# Patient Record
Sex: MALE | Race: BLACK OR AFRICAN AMERICAN | NOT HISPANIC OR LATINO | URBAN - METROPOLITAN AREA
[De-identification: names, ages, dates, MRNs, and addresses within clinical notes are randomized per-mention and may not be internally consistent; named-entity substitution may affect disease eponyms.]

---

## 2018-07-12 ENCOUNTER — OFFICE VISIT (OUTPATIENT)
Dept: FAMILY MEDICINE | Facility: CLINIC | Age: 33
End: 2018-07-12
Payer: COMMERCIAL

## 2018-07-12 VITALS
BODY MASS INDEX: 34.13 KG/M2 | SYSTOLIC BLOOD PRESSURE: 111 MMHG | DIASTOLIC BLOOD PRESSURE: 78 MMHG | WEIGHT: 295 LBS | RESPIRATION RATE: 16 BRPM | TEMPERATURE: 98.1 F | OXYGEN SATURATION: 96 % | HEART RATE: 67 BPM | HEIGHT: 78 IN

## 2018-07-12 DIAGNOSIS — Z78.9 RECENT FOREIGN TRAVEL: ICD-10-CM

## 2018-07-12 DIAGNOSIS — Z00.00 ROUTINE GENERAL MEDICAL EXAMINATION AT A HEALTH CARE FACILITY: Primary | ICD-10-CM

## 2018-07-12 DIAGNOSIS — R89.9 ABNORMAL LABORATORY TEST: ICD-10-CM

## 2018-07-12 DIAGNOSIS — Z72.51 UNPROTECTED SEX: ICD-10-CM

## 2018-07-12 LAB
ALBUMIN SERPL-MCNC: 4.6 MG/DL (ref 3.8–5)
ALP SERPL-CCNC: 78.9 U/L (ref 31.7–110.7)
ALT SERPL-CCNC: 25.7 U/L (ref 0–45)
AST SERPL-CCNC: 18.3 U/L (ref 0–55)
BILIRUB SERPL-MCNC: 0.7 MG/DL (ref 0.2–1.3)
BUN SERPL-MCNC: 9.8 MG/DL (ref 7–21)
CALCIUM SERPL-MCNC: 9.2 MG/DL (ref 8.5–10.1)
CHLORIDE SERPLBLD-SCNC: 103.3 MMOL/L (ref 98–110)
CO2 SERPL-SCNC: 27.7 MMOL/L (ref 20–32)
CREAT SERPL-MCNC: 0.7 MG/DL (ref 0.7–1.3)
GFR SERPL CREATININE-BSD FRML MDRD: >90 ML/MIN/1.7 M2
GLUCOSE SERPL-MCNC: 109.1 MG'DL (ref 70–99)
POTASSIUM SERPL-SCNC: 3.8 MMOL/DL (ref 3.3–4.5)
PROT SERPL-MCNC: 7.6 G/DL (ref 6.8–8.8)
SODIUM SERPL-SCNC: 138.6 MMOL/L (ref 132.6–141.4)

## 2018-07-12 ASSESSMENT — ENCOUNTER SYMPTOMS
APPETITE CHANGE: 0
FEVER: 0
DIAPHORESIS: 0
UNEXPECTED WEIGHT CHANGE: 0
FACIAL SWELLING: 0
ACTIVITY CHANGE: 0
CHILLS: 0
FATIGUE: 0

## 2018-07-12 NOTE — PROGRESS NOTES
Male Physical Note          HPI       Mr. Welch is a 33 year old male with no significant past medical history who is coming in today to establish care. He does not report any major concerns regarding his health today other than concerns of GI issues based on his encounter with a health care provider in [facility] this April. During his visit to [facility] in April, following an acute GI disturbance with few days of diarrhea, vomiting, nausea, and abdominal pain/ epigastric burning sensation, he was advised by a health care provider about possible liver, kidney, and GI ulcer issues. In a second opinion with another health care provider in [facility], he was diagnosed with food poisoning, and his GI symptoms resolved within 2-3 weeks.  He currently does not have any GI issues. He denies any abdominal pain, burning sensation, nausea, diarrhea, vomiting, loss of appetite, fever, or headache. He also hasn't noticed any change in his bowel movements, stool color, or consistency. He states that he has a normal diet and has not had any symptomatic issues related to food in the past or present, other than the acute symptoms in April. Today, his main concern is to establish care and discuss evaluation of liver, renal, and GI function.    Concerns today: No special concerns today other than those noted above.   A [facility]  was used for  this visit.     There is no problem list on file for this patient.    History reviewed. No pertinent past medical history.    Previous Medical Care: immigration requirements (patient moved here in 2016, and returned to [facility] this past spring/summer for the first time since then); intermittent care in [facility] as described above     Family History   Problem Relation Age of Onset     Diabetes No family hx of      Coronary Artery Disease No family hx of      Hypertension No family hx of      Hyperlipidemia No family hx of      Cerebrovascular Disease No family hx of      Breast Cancer No  family hx of             Review of Systems:     Review of Systems:  CONSTITUTIONAL: NEGATIVE for fever, chills, change in weight  INTEGUMENTARY/SKIN: NEGATIVE for worrisome rashes, moles or lesions  EYES: NEGATIVE for vision changes or irritation  ENT/MOUTH: NEGATIVE for ear, mouth and throat problems  RESP: NEGATIVE for significant cough or SOB  CV: NEGATIVE for chest pain, palpitations or peripheral edema  GI: NEGATIVE for nausea, abdominal pain, heartburn, or change in bowel habits; intermittent constipation managed with prune juice  : NEGATIVE for frequency, dysuria, or hematuria  MUSCULOSKELETAL: NEGATIVE for significant arthralgias or myalgia  NEURO: NEGATIVE for weakness, dizziness or paresthesias  ENDOCRINE: NEGATIVE for temperature intolerance, skin/hair changes  HEME/ALLERGY: NEGATIVE for bleeding problems  PSYCHIATRIC: NEGATIVE for changes in mood or affect  Sleep:   Do you snore most or the night (as reported by a family member)? No  Do you feel sleepy or extremely tired during most of the day? No         Social History     Social History     Social History     Marital status: Single     Spouse name: N/A     Number of children: N/A     Years of education: N/A     Occupational History          Social History Main Topics     Smoking status: Former Smoker     Packs/day: 2.00     Types: Cigarettes     Quit date: 2016     Smokeless tobacco: Never Used     Alcohol use No     Drug use: No     Sexual activity: Not on file     Other Topics Concern     [place]oved to the [place] in [place].     Social History Narrative     No narrative on file     Marital Status:   Has anyone hurt you physically, for example by pushing, hitting, slapping or kicking you or forcing you to have sex? Denies  Do you feel threatened or controlled by a partner, ex-partner or anyone in your life? Denies    Sexual Health     Sexual concerns: No, patient is sexually active with his wife, who lives back in [facility] [place] with their 5 year  "old son, and his wife is currently pregnant. He engages in unprotected sexual intercourse with his wife.  STI History: Neg    Recommended Screening     HIV screening:  Recommended and patient accepted testing.         Physical Exam:     Vitals: /78  Pulse 67  Temp 98.1  F (36.7  C) (Oral)  Resp 16  Ht 6' 6.5\" (199.4 cm)  Wt 295 lb (133.8 kg)  SpO2 96%  BMI 33.66 kg/m2  BMI= Body mass index is 33.66 kg/(m^2).  GENERAL: healthy, alert and no distress  EYES: Eyes grossly normal to inspection, extraocular movements - intact, and PERRL  HENT: ear canals- normal; TMs- normal; Nose- normal; Mouth- no ulcers, no lesions  NECK: no tenderness, no adenopathy, no asymmetry, no masses, no stiffness; thyroid- normal to palpation  RESP: lungs clear to auscultation - no rales, no rhonchi, no wheezes  CV: regular rates and rhythm, normal S1 S2, no S3 or S4 and no murmur, no click or rub -  ABDOMEN: soft, no tenderness, no  hepatosplenomegaly, no masses, normal bowel sounds  MS: extremities- no gross deformities noted, no edema  SKIN: no suspicious lesions, no rashes  NEURO: strength and tone- normal, sensory exam- grossly normal, mentation- intact, speech- normal, reflexes- symmetric  BACK: no CVA tenderness, no paralumbar tenderness  PSYCH: Alert and oriented times 3; speech- coherent , normal rate and volume; able to articulate logical thoughts, able to abstract reason, no tangential thoughts, no hallucinations or delusions, affect- normal  LYMPHATICS: ant. cervical- normal, post. Cervical  : deferred as patient declines and has no concerns at this time    Assessment and Plan      Rebekah was seen today for establish care and discuss his concerns about GI issues. Based on his history and physical findings, his acute GI symptoms in April is not likely due to liver, renal, or GI problems. His diagnosis of food poisoning from a second opinion from another healthcare provider was likely the reason for his GI symptoms. " Based on these findings, there is no immediate concerns for liver, kidney, or GI issues. This was discussed with the patient, but he requested an evaluation of his metabolic function. Therefore a CMP was ordered today to establish baseline status. Also, based on his age (33), unknown immunization history, and recent visit to [facility] he will be receiving HIV screening (USPSTF; grade A recommendation), Quant. Gold test, and TDap. This was discussed and agreed upon by the patient.     Diagnoses and all orders for this visit:    Routine general medical examination at a health care facility    1. Health Care Maintenance: Normal Physical Exam    PLAN:  1.CMP ordered  2. HIV screening  3. TDap vaccination  4. TB test (Quant. Gold)    Options for treatment and follow-up care were reviewed with the patient. Rebekah Toro and/or guardian engaged in the decision making process and verbalized understanding of the options discussed and agreed with the final plan.     Anamaria Jacques  MS4    I was present with the medical student who participated in the service and in the documentation of this note. I have verified the history and personally performed the physical exam and medical decision making, and have verified the content of the note, which accurately reflects my assessment of the patient and the plan of care.   Patricia Shaikh, DO

## 2018-07-12 NOTE — NURSING NOTE
Due to patient being non-English speaking/uses sign language, an  was used for this visit. Only for face-to-face interpretation by an external agency, date and length of interpretation can be found on the scanned worksheet.     name: Nicole Oliva  Agency: [employer]  Language: Lao   Telephone number: [phone]  Type of interpretation: Face-to-face, spoken     Melinda White CMA

## 2018-07-12 NOTE — MR AVS SNAPSHOT
After Visit Summary   7/12/2018    Rebekah Toro    MRN: [id number]           Patient Information     Date Of Birth          1985        Visit Information        Provider Department      7/12/2018 8:20 AM Patricia Shaikh DO [facility]        Today's Diagnoses     Routine general medical examination at a health care facility    -  1    Unprotected sex        Recent foreign travel        Abnormal laboratory test          Care Instructions      Preventive Health Recommendations  Male Ages 26 - 39    Yearly exam:             See your health care provider every year in order to  o   Review health changes.   o   Discuss preventive care.    o   Review your medicines if your doctor has prescribed any.    You should be tested each year for STDs (sexually transmitted diseases), if you re at risk.     After age 35, talk to your provider about cholesterol testing. If you are at risk for heart disease, have your cholesterol tested at least every 5 years.     If you are at risk for diabetes, you should have a diabetes test (fasting glucose).  Shots: Get a flu shot each year. Get a tetanus shot every 10 years.     Nutrition:    Eat at least 5 servings of fruits and vegetables daily.     Eat whole-grain bread, whole-wheat pasta and brown rice instead of white grains and rice.     Get adequate Calcium and Vitamin D.     Lifestyle    Exercise for at least 150 minutes a week (30 minutes a day, 5 days a week). This will help you control your weight and prevent disease.     Limit alcohol to one drink per day.     No smoking.     Wear sunscreen to prevent skin cancer.     See your dentist every six months for an exam and cleaning.             Follow-ups after your visit        Who to contact     Please call your clinic at [phone] to:    Ask questions about your health    Make or cancel appointments    Discuss your medicines    Learn about your test results    Speak to your doctor             "Additional Information About Your Visit        Care EveryWhere ID     This is your Care EveryWhere ID. This could be used by other organizations to access your [facility] medical records  [id number]        Your Vitals Were     Pulse Temperature Respirations Height Pulse Oximetry BMI (Body Mass Index)    67 98.1  F (36.7  C) (Oral) 16 6' 6.5\" (199.4 cm) 96% 33.66 kg/m2       Blood Pressure from Last 3 Encounters:   07/12/18 111/78    Weight from Last 3 Encounters:   07/12/18 295 lb (133.8 kg)              We Performed the Following     Comprehensive Metabolic Panel ([facility])     HIV Antigen Antibody Combo     M Tuberculosis by Quantiferon        Primary Care Provider Fax #    Physician No Ref-Primary [phone]       No address on file        Equal Access to Services     EDYTA COMBS : Abby zabalao Sofany, waaxda luqadaha, qaybta kaalmada adeegyada, armando saleh . So [facility] [phone].    ATENCIÓN: Si habla español, tiene a myers disposición servicios gratuitos de asistencia lingüística. Llame al [phone].    We comply with applicable federal civil rights laws and [place] laws. We do not discriminate on the basis of race, color, national origin, age, disability, sex, sexual orientation, or gender identity.            Thank you!     Thank you for choosing [facility] [place]  for your care. Our goal is always to provide you with excellent care. Hearing back from our patients is one way we can continue to improve our services. Please take a few minutes to complete the written survey that you may receive in the mail after your visit with us. Thank you!             Your Updated Medication List - Protect others around you: Learn how to safely use, store and throw away your medicines at www.disposemymeds.org.      Notice  As of 7/12/2018 11:59 PM    You have not been prescribed any medications.      "

## 2018-07-12 NOTE — PROGRESS NOTES
Preceptor Attestation:   Patient seen, evaluated and discussed with the resident. I have verified the content of the note, which accurately reflects my assessment of the patient and the plan of care.   Supervising Physician:  Cyndie Eden MD

## 2018-07-13 LAB
M TB TUBERC IFN-G BLD QL: POSITIVE
M TB TUBERC IFN-G/MITOGEN IGNF BLD: 6.34 IU/ML

## 2018-07-16 LAB — HIV 1+2 AB+HIV1 P24 AG SERPL QL IA: NONREACTIVE

## 2018-07-23 ENCOUNTER — RADIANT APPOINTMENT (OUTPATIENT)
Dept: GENERAL RADIOLOGY | Facility: CLINIC | Age: 33
End: 2018-07-23
Attending: FAMILY MEDICINE
Payer: COMMERCIAL

## 2018-07-23 ENCOUNTER — OFFICE VISIT (OUTPATIENT)
Dept: FAMILY MEDICINE | Facility: CLINIC | Age: 33
End: 2018-07-23
Payer: COMMERCIAL

## 2018-07-23 VITALS
OXYGEN SATURATION: 97 % | SYSTOLIC BLOOD PRESSURE: 115 MMHG | WEIGHT: 296.8 LBS | TEMPERATURE: 98.6 F | HEART RATE: 86 BPM | DIASTOLIC BLOOD PRESSURE: 78 MMHG | BODY MASS INDEX: 33.86 KG/M2 | RESPIRATION RATE: 16 BRPM

## 2018-07-23 DIAGNOSIS — Z22.7 LATENT TUBERCULOSIS BY BLOOD TEST: Primary | ICD-10-CM

## 2018-07-23 DIAGNOSIS — R76.12 POSITIVE QUANTIFERON-TB GOLD TEST: ICD-10-CM

## 2018-07-23 RX ORDER — ISONIAZID 300 MG/1
300 TABLET ORAL DAILY
Qty: 30 TABLET | Refills: 0 | Status: SHIPPED | OUTPATIENT
Start: 2018-07-23 | End: 2018-08-20

## 2018-07-23 RX ORDER — LANOLIN ALCOHOL/MO/W.PET/CERES
100 CREAM (GRAM) TOPICAL DAILY
Qty: 30 TABLET | Refills: 11 | Status: SHIPPED | OUTPATIENT
Start: 2018-07-23 | End: 2018-10-15

## 2018-07-23 ASSESSMENT — ENCOUNTER SYMPTOMS
APNEA: 0
GASTROINTESTINAL NEGATIVE: 1
APPETITE CHANGE: 0
TROUBLE SWALLOWING: 0
FEVER: 0
DIZZINESS: 0
COUGH: 0
FATIGUE: 0
HEADACHES: 0
RHINORRHEA: 0
SORE THROAT: 0
ENDOCRINE NEGATIVE: 1
EYE REDNESS: 0
CHOKING: 0
NUMBNESS: 0
PSYCHIATRIC NEGATIVE: 1
EYE PAIN: 0
UNEXPECTED WEIGHT CHANGE: 0
MUSCULOSKELETAL NEGATIVE: 1
EYE ITCHING: 0
SINUS PRESSURE: 0
WEAKNESS: 0
SINUS PAIN: 0
CHEST TIGHTNESS: 0
LIGHT-HEADEDNESS: 0
SHORTNESS OF BREATH: 0
ACTIVITY CHANGE: 0
CHILLS: 0

## 2018-07-23 NOTE — PROGRESS NOTES
HPI       Chief Complaint   Patient presents with     PPD Reading     Positive Quant Gold/ CHest Xray; No concerns     Rebekah Toro is a 33 year old male with no significant past medical history who presents today to follow up on his recently positive Quant Gold TB results (7/12/18). The TB test was ordered on his last visit (establishing care) because of his recent travel history to [facility] (04/18) where he experienced few weeks of gastroenteritis like illness. Today he is here, as per recommended guidelines, to get a chest xray to confirm the nature of his TB infection. He denies any recent fever, chills, headache, cough, respiratory difficulties, appetite change, or chest pain. He reports feeling healthy and does not have any other health concerns.     A [facility]  was used for  this visit.    +++++++      Problem, Medication and Allergy Lists were    There are no active problems to display for this patient.  ,     ,   No Known Allergies.    Patient is an established patient of this clinic..         Review of Systems:   Review of Systems   Constitutional: Negative for activity change, appetite change, chills, fatigue, fever and unexpected weight change.   HENT: Negative for congestion, hearing loss, rhinorrhea, sinus pain, sinus pressure, sneezing, sore throat and trouble swallowing.    Eyes: Negative for pain, redness, itching and visual disturbance.   Respiratory: Negative for apnea, cough, choking, chest tightness and shortness of breath.    Cardiovascular: Negative for chest pain.   Gastrointestinal: Negative.    Endocrine: Negative.    Genitourinary: Negative.    Musculoskeletal: Negative.    Skin: Negative.    Neurological: Negative for dizziness, weakness, light-headedness, numbness and headaches.   Psychiatric/Behavioral: Negative.             Physical Exam:     Vitals:    07/23/18 0957   BP: 115/78   Pulse: 86   Resp: 16   Temp: 98.6  F (37  C)   TempSrc: Oral   SpO2: 97%   Weight: 296 lb  12.8 oz (134.6 kg)     Body mass index is 33.86 kg/(m^2).  Vitals were reviewed and were normal     Physical Exam   Constitutional: He is oriented to person, place, and time. He appears well-developed and well-nourished. No distress.   HENT:   Head: Normocephalic and atraumatic.   Right Ear: External ear normal.   Left Ear: External ear normal.   Cardiovascular: Normal rate, regular rhythm and normal heart sounds.  Exam reveals no gallop and no friction rub.    No murmur heard.  Pulmonary/Chest: Effort normal and breath sounds normal. No respiratory distress. He has no wheezes. He has no rales. He exhibits no tenderness.   Musculoskeletal: Normal range of motion.   Neurological: He is alert and oriented to person, place, and time.   Skin: He is not diaphoretic.   Psychiatric: He has a normal mood and affect. His behavior is normal.   Nursing note and vitals reviewed.        Results:      Results from this visit  Results for orders placed or performed in visit on 07/12/18   HIV Antigen Antibody Combo   Result Value Ref Range    HIV Antigen Antibody Combo Nonreactive NR^Nonreactive       M Tuberculosis by Quantiferon   Result Value Ref Range    M Tuberculosis Result Positive (A) NEG^Negative    M Tuberculosis Antigen Value 6.34 IU/mL   Comprehensive Metabolic Panel ([facility])   Result Value Ref Range    Albumin 4.6 3.8 - 5.0 mg/dL    Alkaline Phosphatase 78.9 31.7 - 110.7 U/L    ALT 25.7 0.0 - 45.0 U/L    AST 18.3 0.0 - 55.0 U/L    Bilirubin Total 0.7 0.2 - 1.3 mg/dL    Urea Nitrogen 9.8 7.0 - 21.0 mg/dL    Calcium 9.2 8.5 - 10.1 mg/dL    Chloride 103.3 98.0 - 110.0 mmol/L    Carbon Dioxide 27.7 20.0 - 32.0 mmol/L    Creatinine 0.7 0.7 - 1.3 mg/dL    Glucose 109.1 (H) 70.0 - 99.0 mg'dL    Potassium 3.8 3.3 - 4.5 mmol/dL    Sodium 138.6 132.6 - 141.4 mmol/L    Protein Total 7.6 6.8 - 8.8 g/dL    GFR Estimate >90 >60.0 mL/min/1.7 m2    GFR Estimate If Black >90 >60.0 mL/min/1.7 m2     Chest x-ray: Normal, no cavitary  lesions or other abnormalities seen.  Personally reviewed by this MD    Assessment and Plan        Rebekah Toro is a 33 year old male with no significant past medical history who presented today to follow up on his recently positive Quant Gold TB results (7/12/18).  A chest xray was recommended as per guidelines following a positive Quant Gold TB test in this patient. Result from the chest XR were normal and didn't show any signs of active TB infection. In lack of any symptomatic findings and a negative chest xray, the patient most likely has a latent TB infection. Although he is currently asymptomatic, treatment is indicated to prevent future reactivation. This was discussed with the patient and plans for a 9 month course of Isoniazid 300 mg daily was made. Potential side effects of isoniazid were discussed.  Patient will follow up with pharmacy in a month.      Latent tuberculosis by blood test  -     isoniazid (NYDRAZID) 300 MG tablet; Take 1 tablet (300 mg) by mouth daily  -     pyridoxine (VITAMIN B-6) 50 MG TABS; Take 2 tablets (100 mg) by mouth daily    Positive QuantiFERON-TB Gold test  -     XR CHEST 1 VW (+PPD OR PREOP); Future    Follow up plan:  Patient has been instructed to make an appointment and follow up with a Pharmacist in 4 weeks for Latent TB       Options for treatment and follow-up care were reviewed with the patient. Rebekah Toro  engaged in the decision making process and verbalized understanding of the options discussed and agreed with the final plan.    Dwayne Poole MD

## 2018-07-23 NOTE — MR AVS SNAPSHOT
After Visit Summary   7/23/2018    Rebekah Toro    MRN: [id number]           Patient Information     Date Of Birth          1985        Visit Information        Provider Department      7/23/2018 10:00 AM Dwayne Poole MD [facility]        Today's Diagnoses     Latent tuberculosis by blood test    -  1    Positive QuantiFERON-TB Gold test          Care Instructions    Here is the plan from today's visit    1. Latent tuberculosis by blood test  Take each of the following once daily   - isoniazid (NYDRAZID) 300 MG tablet; Take 1 tablet (300 mg) by mouth daily  Dispense: 30 tablet; Refill: 0  - pyridoxine (VITAMIN B-6) 50 MG TABS; Take 2 tablets (100 mg) by mouth daily  Dispense: 30 tablet; Refill: 11    2. Positive QuantiFERON-TB Gold test  - XR CHEST 1 VW (+PPD OR PREOP); Future      Follow up plan  Please make an appointment and follow up with a Pharmacist in 4 weeks for Latent TB     Thank you for coming to [facility] today.  Lab Testing:  **If you had lab testing today and your results are reassuring or normal they will be mailed to you or sent through [email or web address] within 7 days.   **If the lab tests need quick action we will call you with the results.  The phone number we will call with results is # [phone] (home) . If this is not the best number please call our clinic and change the number.  Medication Refills:  If you need any refills please call your pharmacy and they will contact us.   If you need to  your refill at a new pharmacy, please contact the new pharmacy directly. The new pharmacy will help you get your medications transferred faster.   Scheduling:  If you have any concerns about today's visit or wish to schedule another appointment please call our office during normal business hours [phone] (8-5:00 M-F)  If a referral was made to a [facility] and you don't get a call from central scheduling please call  [phone].  If a Mammogram was ordered for you at [facility] call [phone] to schedule or change your appointment.  If you had an XRay/CT/Ultrasound/MRI ordered the number is [phone] to schedule or change your radiology appointment.   Medical Concerns:  If you have urgent medical concerns please call [phone] at any time of the day.    Dwayne Poole MD            Follow-ups after your visit        Who to contact     Please call your clinic at [phone] to:    Ask questions about your health    Make or cancel appointments    Discuss your medicines    Learn about your test results    Speak to your doctor            Additional Information About Your Visit        Care EveryWhere ID     This is your Care EveryWhere ID. This could be used by other organizations to access your [facility] medical records  [id number]        Your Vitals Were     Pulse Temperature Respirations Pulse Oximetry BMI (Body Mass Index)       86 98.6  F (37  C) (Oral) 16 97% 33.86 kg/m2        Blood Pressure from Last 3 Encounters:   07/23/18 115/78   07/12/18 111/78    Weight from Last 3 Encounters:   07/23/18 296 lb 12.8 oz (134.6 kg)   07/12/18 295 lb (133.8 kg)                 Today's Medication Changes          These changes are accurate as of 7/23/18 10:43 AM.  If you have any questions, ask your nurse or doctor.               Start taking these medicines.        Dose/Directions    isoniazid 300 MG tablet   Commonly known as:  NYDRAZID   Used for:  Latent tuberculosis by blood test   Started by:  Dwayne Poole MD        Dose:  300 mg   Take 1 tablet (300 mg) by mouth daily   Quantity:  30 tablet   Refills:  0       pyridoxine 50 MG Tabs   Commonly known as:  VITAMIN B-6   Used for:  Latent tuberculosis by blood test   Started by:  Dwayne Poole MD        Dose:  100 mg   Take 2 tablets (100 mg) by mouth daily   Quantity:  30 tablet   Refills:  11            Where to get your medicines      These medications  were sent to [facility] [id number] [place] [facility] - [place], [place] - [address]  [address] [address]     Phone:  [phone]     isoniazid 300 MG tablet    pyridoxine 50 MG Tabs                Primary Care Provider Fax #    Physician No Ref-Primary [phone]       No address on file        Equal Access to Services     RADHAREJI GARRET : Hadii aad ku hadasho Soomaali, waaxda luqadaha, qaybta kaalmada adeagatayada, armando cheema. So [facility] [phone].    ATENCIÓN: Si habla español, tiene a myers disposición servicios gratuitos de asistencia lingüística. [facility] [phone].    We comply with applicable federal civil rights laws and [place] laws. We do not discriminate on the basis of race, color, national origin, age, disability, sex, sexual orientation, or gender identity.            Thank you!     Thank you for choosing [facility]  for your care. Our goal is always to provide you with excellent care. Hearing back from our patients is one way we can continue to improve our services. Please take a few minutes to complete the written survey that you may receive in the mail after your visit with us. Thank you!             Your Updated Medication List - Protect others around you: Learn how to safely use, store and throw away your medicines at www.disposemymeds.org.          This list is accurate as of 7/23/18 10:43 AM.  Always use your most recent med list.                   Brand Name Dispense Instructions for use Diagnosis    isoniazid 300 MG tablet    NYDRAZID    30 tablet    Take 1 tablet (300 mg) by mouth daily    Latent tuberculosis by blood test       pyridoxine 50 MG Tabs    VITAMIN B-6    30 tablet    Take 2 tablets (100 mg) by mouth daily    Latent tuberculosis by blood test

## 2018-07-23 NOTE — PATIENT INSTRUCTIONS
Here is the plan from today's visit    1. Latent tuberculosis by blood test  Take each of the following once daily   - isoniazid (NYDRAZID) 300 MG tablet; Take 1 tablet (300 mg) by mouth daily  Dispense: 30 tablet; Refill: 0  - pyridoxine (VITAMIN B-6) 50 MG TABS; Take 2 tablets (100 mg) by mouth daily  Dispense: 30 tablet; Refill: 11    2. Positive QuantiFERON-TB Gold test  - XR CHEST 1 VW (+PPD OR PREOP); Future      Follow up plan  Please make an appointment and follow up with a Pharmacist in 4 weeks for Latent TB     Thank you for coming to [facility] today.  Lab Testing:  **If you had lab testing today and your results are reassuring or normal they will be mailed to you or sent through [email or web address] within 7 days.   **If the lab tests need quick action we will call you with the results.  The phone number we will call with results is # [phone] (home) . If this is not the best number please call our clinic and change the number.  Medication Refills:  If you need any refills please call your pharmacy and they will contact us.   If you need to  your refill at a new pharmacy, please contact the new pharmacy directly. The new pharmacy will help you get your medications transferred faster.   Scheduling:  If you have any concerns about today's visit or wish to schedule another appointment please call our office during normal business hours [phone] (8-5:00 M-F)  If a referral was made to a [facility] and you don't get a call from central scheduling please call [phone].  If a Mammogram was ordered for you at [facility] call [phone] to schedule or change your appointment.  If you had an XRay/CT/Ultrasound/MRI ordered the number is [phone] to schedule or change your radiology appointment.   Medical Concerns:  If you have urgent medical concerns please call [phone] at any time of the day.    Dwayne Poole MD

## 2018-07-23 NOTE — NURSING NOTE
Due to patient being non-English speaking/uses sign language, an  was used for this visit. Only for face-to-face interpretation by an external agency, date and length of interpretation can be found on the scanned worksheet.     name: Paty Marrero  Agency: [employer]  Language: Anguillan   Telephone number: [phone]  Type of interpretation: Face-to-face, spoken     Rebekah Rodriguez CMA

## 2018-07-29 NOTE — PROGRESS NOTES
07/29/18  Addendum to visit to fix incorrect documentation of medical student role in the patient's care.  Patricia Shaikh, DO  PGY2 Family Medicine Resident  Pager: [phone]

## 2018-08-20 ENCOUNTER — OFFICE VISIT (OUTPATIENT)
Dept: PHARMACY | Facility: CLINIC | Age: 33
End: 2018-08-20
Payer: COMMERCIAL

## 2018-08-20 VITALS
OXYGEN SATURATION: 97 % | WEIGHT: 301.8 LBS | SYSTOLIC BLOOD PRESSURE: 125 MMHG | BODY MASS INDEX: 34.43 KG/M2 | HEART RATE: 79 BPM | RESPIRATION RATE: 16 BRPM | DIASTOLIC BLOOD PRESSURE: 89 MMHG | TEMPERATURE: 97 F

## 2018-08-20 DIAGNOSIS — Z22.7 LATENT TUBERCULOSIS BY BLOOD TEST: ICD-10-CM

## 2018-08-20 RX ORDER — ISONIAZID 300 MG/1
300 TABLET ORAL DAILY
Qty: 30 TABLET | Refills: 0 | Status: SHIPPED | OUTPATIENT
Start: 2018-08-20 | End: 2018-09-17

## 2018-08-20 NOTE — PATIENT INSTRUCTIONS
MEDICATION COMMENTS FROM TODAY:  Suggestions:  -       Continue INH therapy       Follow up:  -       With the pharmacist in 4 weeks  -    -            I offer these suggestions to him and his medical providers. I suggested Marieal make a follow-up appointment with his provider(s) to discuss these suggestions.      Any questions or concerns, please call [phone] or contact me via [employer].     Steven Paiz, PharmD  Medication Management Pharmacist

## 2018-08-20 NOTE — NURSING NOTE
Due to patient being non-English speaking/uses sign language, an  was used for this visit. Only for face-to-face interpretation by an external agency, date and length of interpretation can be found on the scanned worksheet.     name: Paty  Agency: [employer]  Language: Afghan   Telephone number: [phone]  Type of interpretation: Face-to-face, spoken   RAMONA Navarrete 10:04 AM August 20, 2018

## 2018-08-20 NOTE — MR AVS SNAPSHOT
After Visit Summary   8/20/2018    Rebekah Toro    MRN: [id number]           Patient Information     Date Of Birth          1985        Visit Information        Provider Department      8/20/2018 10:00 AM [facility]        Today's Diagnoses     Latent tuberculosis by blood test          Care Instructions    MEDICATION COMMENTS FROM TODAY:  Suggestions:  -       Continue INH therapy       Follow up:  -       With the pharmacist in 4 weeks  -    -            I offer these suggestions to him and his medical providers. I suggested Rebekah make a follow-up appointment with his provider(s) to discuss these suggestions.      Any questions or concerns, please call [phone] or contact me via [employer].     Steven Paiz, PharmD  Medication Management Pharmacist              Follow-ups after your visit        Who to contact     Please call your clinic at [phone] to:    Ask questions about your health    Make or cancel appointments    Discuss your medicines    Learn about your test results    Speak to your doctor            Additional Information About Your Visit        Care EveryWhere ID     This is your Care EveryWhere ID. This could be used by other organizations to access your [facility] medical records  [id number]        Your Vitals Were     Pulse Temperature Respirations Pulse Oximetry BMI (Body Mass Index)       79 97  F (36.1  C) (Oral) 16 97% 34.43 kg/m2        Blood Pressure from Last 3 Encounters:   08/20/18 125/89   07/23/18 115/78   07/12/18 111/78    Weight from Last 3 Encounters:   08/20/18 301 lb 12.8 oz (136.9 kg)   07/23/18 296 lb 12.8 oz (134.6 kg)   07/12/18 295 lb (133.8 kg)              Today, you had the following     No orders found for display         Where to get your medicines      These medications were [facility] to [facility] [place] [place] - [address]  [address] [address]     Phone:   [phone]     isoniazid 300 MG tablet          Primary Care Provider Office Phone # Fax #    Patricia Shaikh [phone]       [address] [address]        Equal Access to Services     EDYTA COMBS : Hadii ashlie ku hadlorraineo Soomaali, waaxda luqadaha, qaybta kaalmada adeegyada, waxay idiin haywoon adeeg hilario therese cheema. So [facility] [phone].    ATENCIÓN: Si habla español, tiene a myers disposición servicios gratuitos de asistencia lingüística. Llame al [phone].    We comply with applicable federal civil rights laws and [place] laws. We do not discriminate on the basis of race, color, national origin, age, disability, sex, sexual orientation, or gender identity.            Thank you!     Thank you for choosing [facility]  for your care. Our goal is always to provide you with excellent care. Hearing back from our patients is one way we can continue to improve our services. Please take a few minutes to complete the written survey that you may receive in the mail after your visit with us. Thank you!             Your Updated Medication List - Protect others around you: Learn how to safely use, store and throw away your medicines at www.disposemymeds.org.          This list is accurate as of 8/20/18 10:35 AM.  Always use your most recent med list.                   Brand Name Dispense Instructions for use Diagnosis    isoniazid 300 MG tablet    NYDRAZID    30 tablet    Take 1 tablet (300 mg) by mouth daily    Latent tuberculosis by blood test       pyridoxine 50 MG Tabs    VITAMIN B-6    30 tablet    Take 2 tablets (100 mg) by mouth daily    Latent tuberculosis by blood test

## 2018-08-20 NOTE — PROGRESS NOTES
Clinical Pharmacy Note     Rebekah was referred by Patricia Hollis  for pharmacy services for MTM      MEDICATION REVIEW:  Discussed all medication indications, dosage and effectiveness, adverse effects, and adherence with patient/caregiver.    Pt had meds with them: no  Pt had med list with them: no  Pt was knowledgeable about meds: yes  Medications set up by: self   Medications administered by someone else (e.g., LTCF): No  Pt uses a medication box or automated dispenser: no  Called pharmacy to obtain or clarify med list:  no  Called HHN or LTCF to obtain or clarify med list:  no    Medication Discrepancies  Medications on EMR med list that pt is NOT taking:  none  Medications pt IS taking that are NOT on EMR med list (e.g., from specialist, hospital): none  OTC meds/ dietary supplements pt taking on own that are NOT on EMR med list:  none  Dosage listed differently than how patient is taking: none  Frequency listed differently than how patient is taking: none  Duplicate medication on list (two occurrences of the same medication):  none  TOTAL NUMBER OF MEDICATION DISCREPANCIES:  0  ______________________________________________________________________      Subjective:  Rebekah is here today for follow-up of his latent tuberculosis.      1) LATENT TUBERCULOSIS;     Rebekah TB Gold test was seen by Dr. Poole on July 23, 2018 and diagnosed with latent tuberculosis using positive TB GOLD test.  Chest Ray Negative.    Rebekah was started on isoniazid 300 mg daily.  He has also been taking the vitamin B6 on a daily basis.    He was provided education on latent tuberculosis and understands the basics around this condition.  He also understands that this is to prevent future disease or complications related to latent tuberculosis.    Poor appetite No  Nausea / Vomiting No  RUQ abdominal tenderness:  No  Tea/ Coffee colored Urine:  No  Unusual Fatigue: No  Dizziness: No  Rash/ Itching:  No  Yellow Skin/ eyes:   No  Numbness/ Tingling in arms/legs: No  Fever for 3 days: No      Adherence:  Missed doses this month: 0        Patient reported being compliant all of the time   Patient reports no side effects.      Objective:    /89 (BP Location: Left arm, Patient Position: Sitting, Cuff Size: Adult Large)  Pulse 79  Temp 97  F (36.1  C) (Oral)  Resp 16  Wt 301 lb 12.8 oz (136.9 kg)  SpO2 97%  BMI 34.43 kg/m2  BP Readings from Last 6 Encounters:   08/20/18 125/89   07/23/18 115/78   07/12/18 111/78     Estimated Creatinine Clearance: 236.4 mL/min (based on Cr of 0.7).  GFR Estimate   Date Value Ref Range Status   07/12/2018 >90 >60.0 mL/min/1.7 m2 Final     GFR Estimate If Black   Date Value Ref Range Status   07/12/2018 >90 >60.0 mL/min/1.7 m2 Final       There is no immunization history on file for this patient.   Patient Active Problem List   Diagnosis     Latent tuberculosis by blood test     SOCIAL HISTORY:   reports that he quit smoking about 2 years ago. His smoking use included Cigarettes. He smoked 2.00 packs per day. He has never used smokeless tobacco. He reports that he does not drink alcohol or use illicit drugs.    Current Outpatient Prescriptions   Medication Sig Dispense Refill     isoniazid (NYDRAZID) 300 MG tablet Take 1 tablet (300 mg) by mouth daily 30 tablet 0     pyridoxine (VITAMIN B-6) 50 MG TABS Take 2 tablets (100 mg) by mouth daily 30 tablet 11     No Known Allergies    Environmental factors that may impact patient: none.  /89 (BP Location: Left arm, Patient Position: Sitting, Cuff Size: Adult Large)  Pulse 79  Temp 97  F (36.1  C) (Oral)  Resp 16  Wt 301 lb 12.8 oz (136.9 kg)  SpO2 97%  BMI 34.43 kg/m2    Drug Therapy Assessment:  Drug therapy problems identified:       1. Latent tuberculosis by blood test  Today I spent time educating the patient latent tuberculosis.  Rebekah  has been able to tolerate isoniazid.  Describes no adverse effects and is willing to continue in  therapy.  Has completed first month of his 9 month therapy.  He understands that he needs to complete the remaining months, and upon completion will receive a letter from the clinic describing therapy treatment complete.        All medications were reviewed and found to be indicated, effective, safe and convenient unless drug therapy problem identified as described above.        Drug Therapy Plan and Follow up:    Plan  1. Latent tuberculosis by blood test    - isoniazid (NYDRAZID) 300 MG tablet; Take 1 tablet (300 mg) by mouth daily  Dispense: 30 tablet; Refill: 0        Follow up: One month with pharmacy  Patient was provided with written instructions/medication list via [employer]        Options for treatment and/or follow-up care were reviewed with the patient. Patient was engaged and actively involved in the decision making process.  Rebekah Toro verbalized understanding of the options discussed and was satisfied with the final plan.      Dr. Patricia Shaikh  was provided my action  via routed note and Dr. Poole was available for supervision during this visit and is the authorizing prescriber for this visit through the pharmacist collaborative practice agreement.      Steven Paiz, Pharm.D             Total Time: 20  # DTPs Identified: 1    Medical Condition 1: ID (i.e. CAP, UTI, URI, LTBI), Goals of therapy: Not at goal, Drug Class: Antibiotic, Indication: Needs additional drug therapy,  ,  ,  , Intervention: Refill, Verification: Patient Agreed - Compliance/Education   ,  ,  ,  ,  ,  ,  ,  ,     ,  ,  ,  ,  ,  ,  ,  ,     ,  ,  ,

## 2018-09-17 ENCOUNTER — OFFICE VISIT (OUTPATIENT)
Dept: PHARMACY | Facility: CLINIC | Age: 33
End: 2018-09-17
Payer: COMMERCIAL

## 2018-09-17 VITALS
RESPIRATION RATE: 16 BRPM | DIASTOLIC BLOOD PRESSURE: 77 MMHG | BODY MASS INDEX: 34.8 KG/M2 | HEART RATE: 81 BPM | TEMPERATURE: 99.4 F | OXYGEN SATURATION: 96 % | SYSTOLIC BLOOD PRESSURE: 119 MMHG | WEIGHT: 305 LBS

## 2018-09-17 DIAGNOSIS — Z22.7 LATENT TUBERCULOSIS BY BLOOD TEST: ICD-10-CM

## 2018-09-17 RX ORDER — ISONIAZID 300 MG/1
300 TABLET ORAL DAILY
Qty: 30 TABLET | Refills: 0 | Status: SHIPPED | OUTPATIENT
Start: 2018-09-17 | End: 2018-09-17

## 2018-09-17 RX ORDER — ISONIAZID 300 MG/1
300 TABLET ORAL DAILY
Qty: 30 TABLET | Refills: 0 | Status: SHIPPED | OUTPATIENT
Start: 2018-09-17 | End: 2018-10-15

## 2018-09-17 NOTE — PROGRESS NOTES
Clinical Pharmacy Note     Rebekah was referred by Patricia Hollis  for pharmacy services for MTM      MEDICATION REVIEW:  Discussed all medication indications, dosage and effectiveness, adverse effects, and adherence with patient/caregiver.    Pt had meds with them: no  Pt had med list with them: no  Pt was knowledgeable about meds: YES  Medications set up by: self  Medications administered by someone else (e.g., LTCF): No  Pt uses a medication box or automated dispenser: no  Called pharmacy to obtain or clarify med list:  no  Called HHN or LTCF to obtain or clarify med list:  no    Medication Discrepancies  Medications on EMR med list that pt is NOT taking:  none  Medications pt IS taking that are NOT on EMR med list (e.g., from specialist, hospital): none  OTC meds/ dietary supplements pt taking on own that are NOT on EMR med list:  none  Dosage listed differently than how patient is taking: none  Frequency listed differently than how patient is taking: none  Duplicate medication on list (two occurrences of the same medication):  none  TOTAL NUMBER OF MEDICATION DISCREPANCIES:  0  ______________________________________________________________________      Subjective:  Rebekah is here today for follow-up of his isoniazid therapy.  He has been compliant with his medication, and continues to take the vitamin B6.    1)LTBI  Poor appetite No  Nausea / Vomiting No  RUQ abdominal tenderness:  No  Tea/ Coffee colored Urine:  Notes a brighter yellow color in his urine  Unusual Fatigue: No  Dizziness: No  Rash/ Itching:  No  Yellow Skin/ eyes:  No  Numbness/ Tingling in arms/legs: No  Fever for 3 days: No      Adherence:  Missed doses this month: 0      Patient reported being compliant all of the time   Patient reports no side effects.      Objective:    /77  Pulse 81  Temp 99.4  F (37.4  C) (Oral)  Resp 16  Wt 305 lb (138.3 kg)  SpO2 96%  BMI 34.8 kg/m2  BP Readings from Last 6 Encounters:   09/17/18 119/77    08/20/18 125/89   07/23/18 115/78   07/12/18 111/78     Estimated Creatinine Clearance: 237.6 mL/min (based on Cr of 0.7).  GFR Estimate   Date Value Ref Range Status   07/12/2018 >90 >60.0 mL/min/1.7 m2 Final     GFR Estimate If Black   Date Value Ref Range Status   07/12/2018 >90 >60.0 mL/min/1.7 m2 Final       There is no immunization history on file for this patient.     /77  Pulse 81  Temp 99.4  F (37.4  C) (Oral)  Resp 16  Wt 305 lb (138.3 kg)  SpO2 96%  BMI 34.8 kg/m2    Drug Therapy Assessment:  Drug therapy problems identified:       1. Latent tuberculosis by blood test  Rebekah is a 33-year-old male in treatment for latent tuberculosis.  He has completed 2 months of INH therapy and continues to take the B6 vitamin.  No new complaints or concerns today from the patient.  Patient understands to continue to take the medication for the full 9 months.  Plan today is to provide 1 more month of INH refill, and it next visit measure LFTs.  The patient has some concern of the change in his urine color although no other symptoms are present.  Plan to evaluate liver function and if normal continue with less frequent adherence visits in clinic.      INH Tx anticipated to be complete in April 2019    All medications were reviewed and found to be indicated, effective, safe and convenient unless drug therapy problem identified as described above.        Drug Therapy Plan and Follow up:    Plan  1. Latent tuberculosis by blood test  - isoniazid (NYDRAZID) 300 MG tablet; Take 1 tablet (300 mg) by mouth daily  Dispense: 30 tablet; Refill: 0  Continue on Vit B6      Follow up: 4 weeks  Patient was provided with written instructions/medication list via AVS        Options for treatment and/or follow-up care were reviewed with the patient. Patient was engaged and actively involved in the decision making process.  Rebekah Toro verbalized understanding of the options discussed and was satisfied with the final plan.       Patricia Hollis  was provided my action  via routed note and Dr. Lal was available for supervision during this visit and is the authorizing prescriber for this visit through the pharmacist collaborative practice agreement.      Steven Paiz, Pharm.D             Total Time: 20  # DTPs Identified: 0    Medical Condition 1: ID (i.e. CAP, UTI, URI, LTBI), Goals of therapy: Not at goal, Drug Class: Antibiotic,  ,  ,  ,  , Intervention: Refill, Verification: Patient Agreed - CPA   ,  ,  ,  ,  ,  ,  ,  ,     ,  ,  ,  ,  ,  ,  ,  ,     ,  ,  ,

## 2018-09-17 NOTE — Clinical Note
Pharmacy Service Follow up message: 09/18/18 Rebekah Toro is here for LTBI follow up.   The plan is to refill one more month.  Will order LFTs next time as a precaution, but pt is on track to complete tx.    Thanks, Steven Paiz PharmÁngelaD.

## 2018-09-17 NOTE — MR AVS SNAPSHOT
After Visit Summary   9/17/2018    Rebekah Toro    MRN: [id number]           Patient Information     Date Of Birth          1985        Visit Information        Provider Department      9/17/2018 10:00 AM [facility]        Today's Diagnoses     Latent tuberculosis by blood test           Follow-ups after your visit        Who to contact     Please call your clinic at [phone] to:    Ask questions about your health    Make or cancel appointments    Discuss your medicines    Learn about your test results    Speak to your doctor            Additional Information About Your Visit        Care EveryWhere ID     This is your Care EveryWhere ID. This could be used by other organizations to access your [facility] medical records  [id number]        Your Vitals Were     Pulse Temperature Respirations Pulse Oximetry BMI (Body Mass Index)       81 99.4  F (37.4  C) (Oral) 16 96% 34.8 kg/m2        Blood Pressure from Last 3 Encounters:   09/17/18 119/77   08/20/18 125/89   07/23/18 115/78    Weight from Last 3 Encounters:   09/17/18 305 lb (138.3 kg)   08/20/18 301 lb 12.8 oz (136.9 kg)   07/23/18 296 lb 12.8 oz (134.6 kg)              Today, you had the following     No orders found for display         Where to get your medicines      These medications were sent to [facility] [address] [place] [address]     Phone:  [phone]     isoniazid 300 MG tablet          Primary Care Provider Office Phone # Fax #    Patricia DO Neel [phone]       [address] [address]        Equal Access to Services     REJI COMBS : Hadii aad sidney hadashlima Sofany, waaxda luqadaha, qaybta kaalmada armando oden. So [facility] [phone].    ATENCIÓN: Si habla español, tiene a myers disposición servicios gratuitos de asistencia lingüística. Llame al  [phone].    We comply with applicable federal civil rights laws and [place] laws. We do not discriminate on the basis of race, color, national origin, age, disability, sex, sexual orientation, or gender identity.            Thank you!     Thank you for choosing [facility]  for your care. Our goal is always to provide you with excellent care. Hearing back from our patients is one way we can continue to improve our services. Please take a few minutes to complete the written survey that you may receive in the mail after your visit with us. Thank you!             Your Updated Medication List - Protect others around you: Learn how to safely use, store and throw away your medicines at www.disposemymeds.org.          This list is accurate as of 9/17/18 10:22 AM.  Always use your most recent med list.                   Brand Name Dispense Instructions for use Diagnosis    isoniazid 300 MG tablet    NYDRAZID    30 tablet    Take 1 tablet (300 mg) by mouth daily    Latent tuberculosis by blood test       pyridoxine 50 MG Tabs    VITAMIN B-6    30 tablet    Take 2 tablets (100 mg) by mouth daily    Latent tuberculosis by blood test

## 2018-09-17 NOTE — PATIENT INSTRUCTIONS
MEDICATION COMMENTS FROM TODAY:  Suggestions:  -       INH refill sent to [facility]      Follow up:  -       With the pharmacist in 4 weeks    -            I offer these suggestions to him and his medical providers. I suggested Faysal make a follow-up appointment with his provider(s) to discuss these suggestions.      Any questions or concerns, please call [phone] or contact me via [employer].     Steven Paiz, PharmD  Medication Management Pharmacist

## 2018-09-17 NOTE — NURSING NOTE
Due to patient being non-English speaking/uses sign language, an  was used for this visit. Only for face-to-face interpretation by an external agency, date and length of interpretation can be found on the scanned worksheet.     name: Norma Palmer  Agency: [employer]  Language: Malaysian   Telephone number: [phone]  Type of interpretation: Face-to-face, spoken

## 2018-10-15 ENCOUNTER — OFFICE VISIT (OUTPATIENT)
Dept: FAMILY MEDICINE | Facility: CLINIC | Age: 33
End: 2018-10-15
Payer: COMMERCIAL

## 2018-10-15 VITALS
HEART RATE: 70 BPM | OXYGEN SATURATION: 97 % | TEMPERATURE: 98.1 F | DIASTOLIC BLOOD PRESSURE: 70 MMHG | WEIGHT: 303.6 LBS | BODY MASS INDEX: 34.64 KG/M2 | SYSTOLIC BLOOD PRESSURE: 105 MMHG

## 2018-10-15 DIAGNOSIS — Z22.7 LATENT TUBERCULOSIS BY BLOOD TEST: ICD-10-CM

## 2018-10-15 LAB
ALBUMIN SERPL-MCNC: 4.8 MG/DL (ref 3.8–5)
ALP SERPL-CCNC: 69.7 U/L (ref 31.7–110.7)
ALT SERPL-CCNC: 30.3 U/L (ref 0–45)
AST SERPL-CCNC: 25.9 U/L (ref 0–55)
BILIRUB SERPL-MCNC: 1.1 MG/DL (ref 0.2–1.3)
BILIRUBIN DIRECT: 0.4 MG/DL (ref 0.1–0.3)
PROT SERPL-MCNC: 7.9 G/DL (ref 6.8–8.8)

## 2018-10-15 RX ORDER — ISONIAZID 300 MG/1
300 TABLET ORAL DAILY
Qty: 90 TABLET | Refills: 0 | Status: SHIPPED | OUTPATIENT
Start: 2018-10-15 | End: 2019-01-28

## 2018-10-15 NOTE — PROGRESS NOTES
HPI  33 year old male here for LTBI  A [employer]  was used for  this visit.      1. TB meds  Started TB meds in July  Finishing month #3.  No side effects  Compliance good.  Would like 2 month supply    Will also check blood work          ROS  6 point ROS neg other than the symptoms noted above in the HPI.    MEDS  Current Outpatient Prescriptions   Medication     isoniazid (NYDRAZID) 300 MG tablet     pyridoxine (VITAMIN B-6) 50 MG TABS     No current facility-administered medications for this visit.          SOC      FHx  Family History   Problem Relation Age of Onset     Diabetes No family hx of      Coronary Artery Disease No family hx of      Hypertension No family hx of      Hyperlipidemia No family hx of      Cerebrovascular Disease No family hx of      Breast Cancer No family hx of        PHYSICAL EXAMINATION:  Vital signs: /70  Pulse 70  Temp 98.1  F (36.7  C) (Oral)  Wt 303 lb 9.6 oz (137.7 kg)  SpO2 97%  BMI 34.64 kg/m2    Gen: no distress  Abd: soft, nontender, no HSM  Skin: no jaundice  Eyes: no jaundice        LABS  Results for orders placed or performed in visit on 10/15/18 (from the past 24 hour(s))   Hepatic Panel ([facility])   Result Value Ref Range    Albumin 4.8 3.8 - 5.0 mg/dL    Alkaline Phosphatase 69.7 31.7 - 110.7 U/L    ALT 30.3 0.0 - 45.0 U/L    AST 25.9 0.0 - 55.0 U/L    Bilirubin Direct 0.4 (H) 0.1 - 0.3 mg/dL    Bilirubin Total 1.1 0.2 - 1.3 mg/dL    Protein Total 7.9 6.8 - 8.8 g/dL         ASSESSMENT/PLAN    1. Latent tuberculosis by blood test  Finishing month #3  Tolerating well    Refill meds x 3 months    - isoniazid (NYDRAZID) 300 MG tablet; Take 1 tablet (300 mg) by mouth daily  Dispense: 90 tablet; Refill: 0  - pyridOXINE (VITAMIN B6) 100 MG TABS; Take 1 tablet (100 mg) by mouth daily  Dispense: 90 tablet; Refill: 0  - Hepatic Panel ([facility])    2. RTC 3 months  Refill meds  Consider LFTs          GM WHITFIELD

## 2018-10-15 NOTE — LETTER
October 18, 2018      Rebekah Toro  [address]  [place] [address]        Dear Rebekah,    Thank you for getting your care at [facility]. Please see below for your test results.  Your liver tests are all good.    Resulted Orders   Hepatic Panel ([facility])   Result Value Ref Range    Albumin 4.8 3.8 - 5.0 mg/dL    Alkaline Phosphatase 69.7 31.7 - 110.7 U/L    ALT 30.3 0.0 - 45.0 U/L    AST 25.9 0.0 - 55.0 U/L    Bilirubin Direct 0.4 (H) 0.1 - 0.3 mg/dL    Bilirubin Total 1.1 0.2 - 1.3 mg/dL    Protein Total 7.9 6.8 - 8.8 g/dL           Sincerely,    Waqas Harris MD

## 2018-10-15 NOTE — MR AVS SNAPSHOT
After Visit Summary   10/15/2018    Rebekah Toro    MRN: [id number]           Patient Information     Date Of Birth          1985        Visit Information        Provider Department      10/15/2018 11:20 AM Waqas Harris MD [facility]        Today's Diagnoses     Latent tuberculosis by blood test          Care Instructions    1. Latent tuberculosis by blood test  Blood test for liver    Continue isoniazid for 6 more months  You will hopefully get a 90 day supply at the pharmacy    Return to clinic in 3 months for a refill and lab tests      - isoniazid (NYDRAZID) 300 MG tablet; Take 1 tablet (300 mg) by mouth daily  Dispense: 90 tablet; Refill: 0  - pyridOXINE (VITAMIN B6) 100 MG TABS; Take 1 tablet (100 mg) by mouth daily  Dispense: 90 tablet; Refill: 0  - Hepatic Panel ([facility])    PHarper              Follow-ups after your visit        Your next 10 appointments already scheduled     Oct 29, 2018 10:40 AM CDT   CONSULT with [facility] ([facility])    [address] [place] [address],  [address]  [place] [address]   [phone]              Who to contact     Please call your clinic at [phone] to:    Ask questions about your health    Make or cancel appointments    Discuss your medicines    Learn about your test results    Speak to your doctor            Additional Information About Your Visit        MyChart Information     [employer] is an electronic gateway that provides easy, online access to your medical records. With [employer], you can request a clinic appointment, read your test results, renew a prescription or communicate with your care team.     To sign up for [employer] visit the website at [email or web address]   You will be asked to enter the access code listed below, as well as some personal information. Please follow the directions to create your username and password.     Your access code is:  [email or web address]  Expires: 2019 11:54 AM     Your access code will  in 90 days. If you need help or a new code, please contact your [facility] or call [phone] for assistance.        Care EveryWhere ID     This is your Care EveryWhere ID. This could be used by other organizations to access your [facility] medical records  [id number]        Your Vitals Were     Pulse Temperature Pulse Oximetry BMI (Body Mass Index)          70 98.1  F (36.7  C) (Oral) 97% 34.64 kg/m2         Blood Pressure from Last 3 Encounters:   10/15/18 105/70   18 119/77   18 125/89    Weight from Last 3 Encounters:   10/15/18 303 lb 9.6 oz (137.7 kg)   18 305 lb (138.3 kg)   18 301 lb 12.8 oz (136.9 kg)              We Performed the Following     Hepatic Panel ([facility])          Today's Medication Changes          These changes are accurate as of 10/15/18 11:54 AM.  If you have any questions, ask your nurse or doctor.               These medicines have changed or have updated prescriptions.        Dose/Directions    * pyridoxine 50 MG Tabs   Commonly known as:  VITAMIN B-6   This may have changed:  Another medication with the same name was added. Make sure you understand how and when to take each.   Used for:  Latent tuberculosis by blood test   Changed by:  Waqas Harris MD        Dose:  100 mg   Take 2 tablets (100 mg) by mouth daily   Quantity:  30 tablet   Refills:  11       * pyridOXINE 100 MG Tabs   Commonly known as:  VITAMIN B6   This may have changed:  You were already taking a medication with the same name, and this prescription was added. Make sure you understand how and when to take each.   Used for:  Latent tuberculosis by blood test   Changed by:  Waqas Harris MD        Dose:  100 mg   Take 1 tablet (100 mg) by mouth daily   Quantity:  90 tablet   Refills:  0       * Notice:  This list has 2 medication(s) that are the same as other medications prescribed for  you. Read the directions carefully, and ask your doctor or other care provider to review them with you.         Where to get your medicines      These medications were sent to [id number] - [place], [place] - [address]  [address] [address]     Phone:  [phone]     isoniazid 300 MG tablet    pyridOXINE 100 MG Tabs                Primary Care Provider Office Phone # Fax #    Patricia Shaikh [phone]       [address] [address]        Equal Access to Services     EDYTA COMBS : Hadii aad ku hadasho Soomaali, waaxda luqadaha, qaybta kaalmada adeegyada, waxay idiin hayaan adeeg khgeovany saleh . So [facility] [phone].    ATENCIÓN: Si habla español, tiene a myers disposición servicios gratuitos de asistencia lingüística. Llame al [phone].    We comply with applicable federal civil rights laws and [place] laws. We do not discriminate on the basis of race, color, national origin, age, disability, sex, sexual orientation, or gender identity.            Thank you!     Thank you for choosing [facility]  for your care. Our goal is always to provide you with excellent care. Hearing back from our patients is one way we can continue to improve our services. Please take a few minutes to complete the written survey that you may receive in the mail after your visit with us. Thank you!             Your Updated Medication List - Protect others around you: Learn how to safely use, store and throw away your medicines at www.disposemymeds.org.          This list is accurate as of 10/15/18 11:54 AM.  Always use your most recent med list.                   Brand Name Dispense Instructions for use Diagnosis    isoniazid 300 MG tablet    NYDRAZID    90 tablet    Take 1 tablet (300 mg) by mouth daily    Latent tuberculosis by blood test       * pyridoxine 50 MG Tabs    VITAMIN B-6    30 tablet    Take 2 tablets (100 mg) by mouth  daily    Latent tuberculosis by blood test       * pyridOXINE 100 MG Tabs    VITAMIN B6    90 tablet    Take 1 tablet (100 mg) by mouth daily    Latent tuberculosis by blood test       * Notice:  This list has 2 medication(s) that are the same as other medications prescribed for you. Read the directions carefully, and ask your doctor or other care provider to review them with you.

## 2018-10-15 NOTE — PATIENT INSTRUCTIONS
1. Latent tuberculosis by blood test  Blood test for liver    Continue isoniazid for 6 more months  You will hopefully get a 90 day supply at the pharmacy    Return to clinic in 3 months for a refill and lab tests      - isoniazid (NYDRAZID) 300 MG tablet; Take 1 tablet (300 mg) by mouth daily  Dispense: 90 tablet; Refill: 0  - pyridOXINE (VITAMIN B6) 100 MG TABS; Take 1 tablet (100 mg) by mouth daily  Dispense: 90 tablet; Refill: 0  - Hepatic Panel ([facility])    PHarper

## 2018-10-15 NOTE — NURSING NOTE
Due to patient being non-English speaking/uses sign language, an  was used for this visit. Only for face-to-face interpretation by an external agency, date and length of interpretation can be found on the scanned worksheet.     name: cliff rothman  Agency: [employer]  Language: Sudanese   Telephone number: [phone]  Type of interpretation: Face-to-face, spoken

## 2018-12-13 ENCOUNTER — TELEPHONE (OUTPATIENT)
Dept: PHARMACY | Facility: CLINIC | Age: 33
End: 2018-12-13

## 2018-12-31 ENCOUNTER — DOCUMENTATION ONLY (OUTPATIENT)
Dept: FAMILY MEDICINE | Facility: CLINIC | Age: 33
End: 2018-12-31

## 2018-12-31 NOTE — PROGRESS NOTES
I have called pharmacy to inquire as to the progress of Faysal filling his INH    Fill dates are as follows:  [facility] 7/23/18  1 7/23-8-20  2 8/20-9-30  3 9/17/18 -   4 10/15  5 11/18  6 12/22      One refill remaining.      Latasha ArnoldD.

## 2019-01-28 DIAGNOSIS — Z22.7 LATENT TUBERCULOSIS BY BLOOD TEST: ICD-10-CM

## 2019-01-28 RX ORDER — ISONIAZID 300 MG/1
300 TABLET ORAL DAILY
Qty: 90 TABLET | Refills: 0 | Status: SHIPPED | OUTPATIENT
Start: 2019-01-28

## 2019-01-28 NOTE — TELEPHONE ENCOUNTER
RN is routing to provider who saw patient last to ensure that this is an appropriate request and safe to refill without lab monitoring. They are losing insurance coverage, which is why they cannot make their appointment.    Please advise.  Jaylene Mendosa RN

## 2019-01-28 NOTE — TELEPHONE ENCOUNTER
[facility] phone call message- patient requesting a refill:    Full Medication Name: isoniazid (NYDRAZID) 300 MG tablet    Dose: Take 1 tablet (300 mg) by mouth daily - Oral    Pharmacy confirmed as   [id number] - [place], [place] - [address]  [address] [facility] [address]  Phone: [phone] Fax: [phone]    : Yes    Additional Comments:  Patient cancelled appointment today due to inactive insurance. Would like to  the refill today, as soon as possible. Patient is out of medication.     OK to leave a message on voice mail? Yes    Primary language: English      needed? No    Call taken on January 28, 2019 at 8:29 AM by Carolina Oliva

## 2019-04-15 DIAGNOSIS — Z22.7 LATENT TUBERCULOSIS BY BLOOD TEST: ICD-10-CM

## 2019-04-15 NOTE — TELEPHONE ENCOUNTER

## 2019-04-16 DIAGNOSIS — Z22.7 LATENT TUBERCULOSIS BY BLOOD TEST: ICD-10-CM

## 2019-04-16 RX ORDER — ISONIAZID 300 MG/1
300 TABLET ORAL DAILY
Qty: 90 TABLET | Refills: 0 | Status: CANCELLED | OUTPATIENT
Start: 2019-04-16

## 2019-04-16 NOTE — TELEPHONE ENCOUNTER

## 2019-04-17 NOTE — TELEPHONE ENCOUNTER
INH therapy follow up:    Pharmacy confirms refills for INH on the following days:    LTBI 7/23/18  1) 7/23  2) 8/20  3) 9/17/18 -   4) 10/15  5) 11/18  6) 12/22  7) 1/31  8) 2/28  9) 3/24    The patient will complete therapy on 4/24/2019.  I have sent the patient a letter stating that therapy has been complete.    Discussed with PCP today in clinic.  Latasha ArnoldD.

## 2019-04-17 NOTE — TELEPHONE ENCOUNTER
Following message sent to pharmacy team (Steven and Piedad)    I got a refill request for this patient's isoniazid. He has not had a follow-up visit since month #3 on isoniazid back in October 2015. It looks like Steven called the pharmacy to follow-up on refills in December 2018. Would one of you be able to follow-up with the pharmacy again? I did not fill previous refills (I think it went through the automatic process?) - and he should be at 9 months about now given he started July 23, 2018    Patricia Shaikh, DO  PGY2 Family Medicine Resident  Pager: [phone]

## 2019-04-18 PROBLEM — Z22.7 LATENT TUBERCULOSIS BY BLOOD TEST: Status: ACTIVE | Noted: 2018-07-23

## 2022-04-13 ENCOUNTER — OFFICE VISIT (OUTPATIENT)
Dept: FAMILY MEDICINE | Facility: CLINIC | Age: 37
End: 2022-04-13

## 2022-04-13 VITALS
RESPIRATION RATE: 16 BRPM | TEMPERATURE: 98.2 F | WEIGHT: 314 LBS | DIASTOLIC BLOOD PRESSURE: 77 MMHG | SYSTOLIC BLOOD PRESSURE: 118 MMHG | BODY MASS INDEX: 36.33 KG/M2 | OXYGEN SATURATION: 98 % | HEART RATE: 90 BPM | HEIGHT: 78 IN

## 2022-04-13 DIAGNOSIS — R05.9 COUGH: ICD-10-CM

## 2022-04-13 DIAGNOSIS — Z20.822 SUSPECTED 2019 NOVEL CORONAVIRUS INFECTION: Primary | ICD-10-CM

## 2022-04-13 PROCEDURE — U0005 INFEC AGEN DETEC AMPLI PROBE: HCPCS | Performed by: STUDENT IN AN ORGANIZED HEALTH CARE EDUCATION/TRAINING PROGRAM

## 2022-04-13 PROCEDURE — U0003 INFECTIOUS AGENT DETECTION BY NUCLEIC ACID (DNA OR RNA); SEVERE ACUTE RESPIRATORY SYNDROME CORONAVIRUS 2 (SARS-COV-2) (CORONAVIRUS DISEASE [COVID-19]), AMPLIFIED PROBE TECHNIQUE, MAKING USE OF HIGH THROUGHPUT TECHNOLOGIES AS DESCRIBED BY CMS-2020-01-R: HCPCS | Performed by: STUDENT IN AN ORGANIZED HEALTH CARE EDUCATION/TRAINING PROGRAM

## 2022-04-13 PROCEDURE — 99213 OFFICE O/P EST LOW 20 MIN: CPT | Mod: CS | Performed by: STUDENT IN AN ORGANIZED HEALTH CARE EDUCATION/TRAINING PROGRAM

## 2022-04-13 NOTE — PROGRESS NOTES
suspectedNew Patient Note-Recent Return Overseas     HPI     Concerns today: No special concerns today.    Was back in [place] for 4 weeks visiting family    Plans on sponsoring his family to come over from [facility]    No specific concerns today    He works as a semi-truck logistics delivery     Has other family here - brother x2, sister x1 over in [place]    COVID Vax Phisfer 4/22, 5/13 /1/15    Having cold like symptoms for the past week - dry cough / chest congestion - did see doctor back home -> was given antibiotic (at that time he felt more of a sore throat -> 5 days -> felt better then started dry cough)     FHx - non contributory (on parents and sibilings and children)    PMHx - LTB, no current medications right now for anything    Testing: hep B? Stool OnP? BG? CBC?        Patient Active Problem List   Diagnosis     Latent tuberculosis by blood test       History reviewed. No pertinent past medical history.    Immunization History   Administered Date(s) Administered     COVID-19,PF,Pfizer (12+ Yrs) 01/15/2022     [facility] Refugee Immunization Guidelines     Family History   Problem Relation Age of Onset     Diabetes No family hx of      Coronary Artery Disease No family hx of      Hypertension No family hx of      Hyperlipidemia No family hx of      Cerebrovascular Disease No family hx of      Breast Cancer No family hx of           Review of Systems     Review of Systems:  CONSTITUTIONAL: NEGATIVE for fever, chills, change in weight  INTEGUMENTARY/SKIN: NEGATIVE for worrisome rashes, moles or lesions  EYES: NEGATIVE for vision changes or irritation  ENT/MOUTH: NEGATIVE for ear, mouth and throat problems  RESP: POSITIVE for   BREAST: NEGATIVE for masses, tenderness or discharge  CV: NEGATIVE for chest pain, palpitations or peripheral edema  GI: NEGATIVE for nausea, abdominal pain, heartburn, or change in bowel habits  : NEGATIVE for frequency, dysuria, or hematuria  MUSCULOSKELETAL: NEGATIVE for significant  "arthralgias or myalgia  NEURO: NEGATIVE for weakness, dizziness or paresthesias  ENDOCRINE: NEGATIVE for temperature intolerance, skin/hair changes  HEME/ALLERGY: NEGATIVE for bleeding problems  PSYCHIATRIC: NEGATIVE for changes in mood or affect       Social History     Social History     Tobacco Use     Smoking status: Former Smoker     Packs/day: 2.00     Types: Cigarettes     Quit date:      Years since quittin.2     Smokeless tobacco: Never Used   Substance Use Topics     Alcohol use: No       Marital Status:  Who lives in your household? Previously wife, but she is back home with the kids    Country of Origin: [place]    Were you the victim of violence in your home country? No    If YES, offer the following and complete Refugee Mental Health Screen below:    Recount the experience briefly now: No    Visit with a psychologist or therapist: No     Not recounting at this time: No    Refugee Mental Health Screen:  [facility] Mental Health Screening Recommendations  (If any positive results recommend further evaluation)  1. In the past month, have you had many bad dreams or nightmares that remind you of things that happened in your country or refugee camp? Not Applicable    2. In the past month, have you felt very sad? No    3. In the past month, have you been thinking too much about the past (even if you did not want to)? No    4. In the past month, have you avoided situations that remind you of the past? No  Prompt: Do you turn off the radio or TV if the programming is disturbing    5. Do any of these problems make it difficult to do what you need to do on a daily basis? No  Prompt: Are you able to take care of yourself and your family?         Sexual Health     Sexual concerns: None  STI History: Neg     Physical Exam     Vitals: /77   Pulse 90   Temp 98.2  F (36.8  C) (Oral)   Resp 16   Ht 1.981 m (6' 6\")   Wt 142.4 kg (314 lb)   SpO2 98%   BMI 36.29 kg/m    BMI= Body mass index is 36.29 " "kg/m .     {EXAM:186012}     Screenings and Results     Tuberculosis Screening:  [facility] [place] [facility] Tuberculosis Flowchart  [facility] Tuberculosis Guidelines  TB class from overseas exam: None   TST: {TB TEST:860312}   IGRA: Positive in 2018   CXR: Normal   Diagnosis and treatment status: Treated for Latent TB in 4412-1027    Heb B Screening:  [facility] [place] [facility] Hepatitis B Guidelines   Anti-HBs: No history   HBsAg: No history   Anti-HBc: No history    If positive HBsAg, recommend screening and vaccinating household contacts.    Malaria Screening:  [facility] [place] [facility] Malaria Guidelines   Sub-saharan refugee: Not appliacble    Received pre-arrival presumptive treatment? No    If NO, needs presumptive treatment at this time.   -or- Refugees from other endemic areas:     Peripheral smear (only with signs/symptoms):     Sexually Transmitted Diseases Screening:  [facility] [place] [facility] STD Guidelines   HIV:    Negative      Confirmatory: Negative     Referral for therapy: Not needed     Syphilis:  No history      Confirmatory: {NEGATIVE/POSITIVE:950737::\"Negative\"}     Treated: {YES / NO:824795::\"Yes\"}   Gonorrhea: {NEGATIVE/POSITIVE:288075::\"Negative\"}      Treated: {YES / NO:775351::\"Yes\"}   Chlamydia: {NEGATIVE/POSITIVE:098869::\"Negative\"}      Treated: {YES / NO:338129::\"Yes\"}    Intestinal Parasite Screening:  [facility] [place] [facility] Parasite Screening Flowsheet  [facility] Parasite Guidelines   CBC with eosinophilia:  {YES / NO:170446::\"Yes\"}   Schistosoma serology:  {NEGATIVE/POSITIVE:299732::\"Negative\"}        Treated: {YES / NO:584907::\"Yes\"}    Strongyloides serology:  {NEGATIVE/POSITIVE:234152::\"Negative\"}        Treated: {YES / NO:188518::\"Yes\"}   Stool O&P   {PARASITE SCREENIN}    Lead Screen (<17 years old only):  [facility] Lead Guidelines   Venous Blood Lead Level: ***    Blood Glucose: ***+  Hemoglobin: ***  Hematocrit: ***  Vitamin B12: ***    Hearing Screen: Normal  Vision Screen: Normal     Assessment and Plan      There are no diagnoses linked to this encounter.    Options for treatment and " follow-up care were reviewed with the patient . Rebekah Toro  engaged in the decision making process and verbalized understanding of the options discussed and agreed with the final plan.    Juancarlos Patrick, DO

## 2022-04-13 NOTE — PROGRESS NOTES
Preceptor Attestation:   Patient seen, evaluated and discussed with the resident. I have verified the content of the note, which accurately reflects my assessment of the patient and the plan of care.   Supervising Physician:  Destiny Cooley MD

## 2022-04-13 NOTE — Clinical Note
Return from [place] visit where main concern was chest cold symptoms - level 3 (acute, uncomplicated, COVID test was negative, likely viral, supportive cares)

## 2022-04-14 LAB — SARS-COV-2 RNA RESP QL NAA+PROBE: NEGATIVE

## 2022-04-14 NOTE — PROGRESS NOTES
"  Assessment & Plan     Cough  Suspected 2019 novel coronavirus infection  This is likely a viral sequale of his upper respiratory conditions (which also was likely viral, but treated with antibiotics). He states he had a negative COVID test, but I will obtain a Covid test here for our system to make sure.     Educated on supportive cares and worsening signs that would warrant clinic return.    - Symptomatic; Yes; 4/4/2022 COVID-19 Virus (Coronavirus) by PCR Nose         BMI:   Estimated body mass index is 36.29 kg/m  as calculated from the following:    Height as of this encounter: 1.981 m (6' 6\").    Weight as of this encounter: 142.4 kg (314 lb).       No follow-ups on file.    Juancarlos Patrick DO  [facility] CARLA Welch is a 37 year old who presents for clinic visit following overseas travel.     HPI   Recent Overseas Trip    Was back in [facility] [place] for 4 weeks visiting family    Plans on sponsoring his family to come over from [place]    He works as a semi-truck logistics delivery     Has other family here - brother x2, sister x1 over in [place]    Chest Cold    COVID Vax Phizer 4/22, 5/13 /1/15    Having cold like symptoms for the past week - dry cough / chest congestion - coughing mainly at night    Did see doctor back in [place] for throat irritation x2 days -> tx'd with an antibiotic for 5 days -> then developed more chest cold symptoms    Did have a negative COVID test prior to his flight (arrived back yesterday)    Denies fevers / productive cough / SOB    Denies chest pain / chest tightness    Denies sinus pain / pressure / headache    Denies throat irration / throat pain    Review of Systems   Constitutional, HEENT, cardiovascular, pulmonary, gi and gu systems are negative, except as otherwise noted.      Objective    /77   Pulse 90   Temp 98.2  F (36.8  C) (Oral)   Resp 16   Ht 1.981 m (6' 6\")   Wt 142.4 kg (314 lb)   SpO2 98%   BMI 36.29 kg/m    Body mass index is " 36.29 kg/m .  Physical Exam   GENERAL: healthy, alert and no distress  NECK: no adenopathy, no asymmetry, masses, or scars and thyroid normal to palpation  RESP: Some minor wheezing in the upper posts on posterior ascultation   CV: regular rate and rhythm, normal S1 S2, no S3 or S4, no murmur, click or rub, no peripheral edema and peripheral pulses strong  ABDOMEN: soft, nontender, no hepatosplenomegaly, no masses and bowel sounds normal  MS: no gross musculoskeletal defects noted, no edema    ----- Service Performed and Documented by Resident or Fellow ------